# Patient Record
Sex: MALE | Race: WHITE | Employment: UNEMPLOYED | URBAN - METROPOLITAN AREA
[De-identification: names, ages, dates, MRNs, and addresses within clinical notes are randomized per-mention and may not be internally consistent; named-entity substitution may affect disease eponyms.]

---

## 2017-01-31 ENCOUNTER — TELEPHONE (OUTPATIENT)
Dept: CARDIOLOGY | Facility: CLINIC | Age: 57
End: 2017-01-31

## 2017-02-08 ENCOUNTER — OFFICE VISIT (OUTPATIENT)
Dept: CARDIOLOGY | Facility: CLINIC | Age: 57
End: 2017-02-08
Payer: COMMERCIAL

## 2017-02-08 VITALS
WEIGHT: 253.1 LBS | BODY MASS INDEX: 38.36 KG/M2 | SYSTOLIC BLOOD PRESSURE: 128 MMHG | DIASTOLIC BLOOD PRESSURE: 82 MMHG | HEIGHT: 68 IN | HEART RATE: 72 BPM

## 2017-02-08 DIAGNOSIS — I25.10 CORONARY ARTERY DISEASE INVOLVING NATIVE CORONARY ARTERY OF NATIVE HEART WITHOUT ANGINA PECTORIS: Primary | ICD-10-CM

## 2017-02-08 DIAGNOSIS — I24.9 ACS (ACUTE CORONARY SYNDROME) (H): ICD-10-CM

## 2017-02-08 DIAGNOSIS — I10 ESSENTIAL HYPERTENSION: ICD-10-CM

## 2017-02-08 PROCEDURE — 99214 OFFICE O/P EST MOD 30 MIN: CPT | Performed by: INTERNAL MEDICINE

## 2017-02-08 RX ORDER — CLOPIDOGREL BISULFATE 75 MG/1
75 TABLET ORAL DAILY
Qty: 90 TABLET | Refills: 3 | Status: SHIPPED | OUTPATIENT
Start: 2017-02-08

## 2017-02-08 RX ORDER — ATORVASTATIN CALCIUM 20 MG/1
20 TABLET, FILM COATED ORAL DAILY
Qty: 90 TABLET | Refills: 3 | Status: SHIPPED | OUTPATIENT
Start: 2017-02-08

## 2017-02-08 RX ORDER — LOSARTAN POTASSIUM 50 MG/1
50 TABLET ORAL 2 TIMES DAILY
Qty: 180 TABLET | Refills: 3 | Status: SHIPPED | OUTPATIENT
Start: 2017-02-08 | End: 2017-09-08

## 2017-02-08 RX ORDER — NITROGLYCERIN 0.4 MG/1
0.4 TABLET SUBLINGUAL EVERY 5 MIN PRN
Qty: 25 TABLET | Refills: 3 | Status: SHIPPED
Start: 2017-02-08

## 2017-02-08 RX ORDER — METOPROLOL TARTRATE 25 MG/1
25 TABLET, FILM COATED ORAL 2 TIMES DAILY
Qty: 180 TABLET | Refills: 3 | Status: SHIPPED | OUTPATIENT
Start: 2017-02-08

## 2017-02-08 RX ORDER — CHLORAL HYDRATE 500 MG
1 CAPSULE ORAL DAILY
COMMUNITY

## 2017-02-08 NOTE — LETTER
2/8/2017    Giorgio Elizabeth  [address] [address]    RE: Zeyad Mccabe       Dear Colleague,    I had the pleasure of seeing Zeyad Mccabe in the [facility].    Mr. Mccabe is a pleasant 56-year-old male with a history of multivessel coronary disease.  He had 5 stents placed at [facility] in 2009 and he had subsequent revascularization of the left anterior descending artery and right coronary artery in 10/2015.  He is here for a followup visit today.  In the past, he had been intolerant of statin therapies but was placed on low dose Lipitor last year at 10 mg and he seems to be tolerating that quite well without any side effect.  He has not experienced any recurrent chest pain symptoms or dyspnea on exertion or exercise intolerance.  I do not have any recent cholesterol profile as the last one was from 10/2015 prior to his initiation of Lipitor.      PHYSICAL EXAMINATION:  On exam today, his blood pressure is 128/82, pulse is 72, weight 253 which is up about 6 pounds from last year.  Body mass index of 39.  Carotid upstrokes are brisk without bruit.  Cardiovascular tones are regular.  I do not appreciate a murmur, gallop or rub.  The lungs are clear posteriorly.  He has strong and symmetric pulses in the upper and lower extremities without peripheral edema.     Outpatient Encounter Prescriptions as of 2/8/2017   Medication Sig Dispense Refill     fish oil-omega-3 fatty acids 1000 MG capsule Take 1 g by mouth daily       metoprolol (LOPRESSOR) 25 MG tablet Take 1 tablet (25 mg) by mouth 2 times daily 180 tablet 3     atorvastatin (LIPITOR) 20 MG tablet Take 1 tablet (20 mg) by mouth daily 90 tablet 3     losartan (COZAAR) 50 MG tablet Take 1 tablet (50 mg) by mouth 2 times daily 180 tablet 3     clopidogrel (PLAVIX) 75 MG tablet Take 1 tablet (75 mg) by mouth daily 90 tablet 3     nitroglycerin (NITROSTAT) 0.4 MG sublingual tablet Place 1  tablet (0.4 mg) under the tongue every 5 minutes as needed for chest pain If you are still having symptoms after 3 doses (15 minutes) call 911. 25 tablet 3     aspirin EC 81 MG EC tablet Take 1 tablet (81 mg) by mouth daily 30 tablet 0     [DISCONTINUED] losartan (COZAAR) 50 MG tablet Take 1 tablet (50 mg) by mouth 2 times daily 180 tablet 0     [DISCONTINUED] clopidogrel (PLAVIX) 75 MG tablet Take 1 tablet (75 mg) by mouth daily 90 tablet 0     [DISCONTINUED] atorvastatin (LIPITOR) 10 MG tablet Take 1 tablet (10 mg) by mouth daily 30 tablet      [DISCONTINUED] nitroglycerin (NITROSTAT) 0.4 MG SL tablet Place 1 tablet (0.4 mg) under the tongue every 5 minutes as needed for chest pain If you are still having symptoms after 3 doses (15 minutes) call 911. 25 tablet 0     [DISCONTINUED] metoprolol (LOPRESSOR) 25 MG tablet Take 25 mg by mouth 2 times daily       No facility-administered encounter medications on file as of 2/8/2017.       IN SUMMARY:  Mr. Mccabe is a pleasant 56-year-old male with a history of multivessel coronary disease and multivessel stenting.  He has had recent revascularization for in-stent restenosis in his LAD and revascularization of his right coronary artery as well.  He was able to tolerate low dose statin but guidelines would suggest improvement in his overall risk for future cardiovascular events on moderate high-intensity statin therapy and I did talk to him about this today.  Given that he has had severe side effect with myalgias in the past on previous medication I would prefer to continue to slowly increase this medication and I have suggested that we increase from 10 to 20 mg of Lipitor and also recheck his cholesterol numbers in about 3 months.  He is willing to do this, therefore I refilled his prescription at the higher dose of 20 mg to reflect this change.  I also refilled his other medications given multivessel stenting with drug-eluting stents, each one was 38 mm in length and 2 in  the LAD and 1 in the right coronary artery, I would prefer that he stay on dual antiplatelet therapy for a period of 2 years, so I would continue to refill this medication as well as his other cardiac medications as well.  I do note that he was diagnosed with moderate obstructive sleep apnea back in 2015.  He is intolerant to the CPAP mask and has gained further weight since then.  We talked about the consequences of untreated sleep apnea and the importance of treatment.  Certainly, weight loss would be the optimal treatment for his sleep apnea and we talked about this today as well.  He has found obstacles in losing weight.  We discussed dietary changes as well as even 10-15 minutes of exercise daily would be helpful not only to reduce cardiovascular events but for weight loss as well.  I will see him back in 3 months with a lipid profile and liver function tests.        Please feel free to contact me with any questions you have in regard to his care.     Again, thank you for allowing me to participate in the care of your patient.      Sincerely,    Brooklyn Sargent, DO     [facility]

## 2017-02-08 NOTE — PROGRESS NOTES
HPI and Plan:   See dictation    Orders Placed This Encounter   Procedures     Lipid Profile     ALT     Follow-Up with Cardiologist       Orders Placed This Encounter   Medications     fish oil-omega-3 fatty acids 1000 MG capsule     Sig: Take 1 g by mouth daily     metoprolol (LOPRESSOR) 25 MG tablet     Sig: Take 1 tablet (25 mg) by mouth 2 times daily     Dispense:  180 tablet     Refill:  3     atorvastatin (LIPITOR) 20 MG tablet     Sig: Take 1 tablet (20 mg) by mouth daily     Dispense:  90 tablet     Refill:  3     losartan (COZAAR) 50 MG tablet     Sig: Take 1 tablet (50 mg) by mouth 2 times daily     Dispense:  180 tablet     Refill:  3     clopidogrel (PLAVIX) 75 MG tablet     Sig: Take 1 tablet (75 mg) by mouth daily     Dispense:  90 tablet     Refill:  3     nitroglycerin (NITROSTAT) 0.4 MG sublingual tablet     Sig: Place 1 tablet (0.4 mg) under the tongue every 5 minutes as needed for chest pain If you are still having symptoms after 3 doses (15 minutes) call 911.     Dispense:  25 tablet     Refill:  3       Medications Discontinued During This Encounter   Medication Reason     metoprolol (LOPRESSOR) 25 MG tablet Reorder     atorvastatin (LIPITOR) 10 MG tablet Reorder     losartan (COZAAR) 50 MG tablet Reorder     clopidogrel (PLAVIX) 75 MG tablet Reorder     nitroglycerin (NITROSTAT) 0.4 MG SL tablet Reorder         Encounter Diagnoses   Name Primary?     Coronary artery disease involving native coronary artery of native heart without angina pectoris Yes     Essential hypertension      ACS (acute coronary syndrome) (H)        CURRENT MEDICATIONS:  Current Outpatient Prescriptions   Medication Sig Dispense Refill     fish oil-omega-3 fatty acids 1000 MG capsule Take 1 g by mouth daily       metoprolol (LOPRESSOR) 25 MG tablet Take 1 tablet (25 mg) by mouth 2 times daily 180 tablet 3     atorvastatin (LIPITOR) 20 MG tablet Take 1 tablet (20 mg) by mouth daily 90 tablet 3     losartan (COZAAR) 50 MG  tablet Take 1 tablet (50 mg) by mouth 2 times daily 180 tablet 3     clopidogrel (PLAVIX) 75 MG tablet Take 1 tablet (75 mg) by mouth daily 90 tablet 3     nitroglycerin (NITROSTAT) 0.4 MG sublingual tablet Place 1 tablet (0.4 mg) under the tongue every 5 minutes as needed for chest pain If you are still having symptoms after 3 doses (15 minutes) call 911. 25 tablet 3     aspirin EC 81 MG EC tablet Take 1 tablet (81 mg) by mouth daily 30 tablet 0     [DISCONTINUED] losartan (COZAAR) 50 MG tablet Take 1 tablet (50 mg) by mouth 2 times daily 180 tablet 0     [DISCONTINUED] clopidogrel (PLAVIX) 75 MG tablet Take 1 tablet (75 mg) by mouth daily 90 tablet 0     [DISCONTINUED] atorvastatin (LIPITOR) 10 MG tablet Take 1 tablet (10 mg) by mouth daily 30 tablet      [DISCONTINUED] nitroglycerin (NITROSTAT) 0.4 MG SL tablet Place 1 tablet (0.4 mg) under the tongue every 5 minutes as needed for chest pain If you are still having symptoms after 3 doses (15 minutes) call 911. 25 tablet 0     [DISCONTINUED] metoprolol (LOPRESSOR) 25 MG tablet Take 25 mg by mouth 2 times daily         ALLERGIES   No Known Allergies    PAST MEDICAL HISTORY:  Past Medical History   Diagnosis Date     Mixed hyperlipidemia 10/23/2015     Essential hypertension 10/23/2015     NSTEMI (non-ST elevated myocardial infarction) (H) 10/23/2015     History of PTCA 2007     multivessel stenting done thru [facility]     Acute chest pain 10/23/2015     CAD (coronary artery disease) 2007, 2015      stent     SHRUTI (obstructive sleep apnea)        PAST SURGICAL HISTORY:  Past Surgical History   Procedure Laterality Date     Stent, coronary, [place]ison  10-     BIBI=to mid RCA ,BIBI x 2 to mid/distal LAD     Stent, coronary, maddison  2007     [facility]       FAMILY HISTORY:  Family History   Problem Relation Age of Onset     Other - See Comments       Heart disease- both grandfathers.        SOCIAL HISTORY:  Social History     Social History     Marital Status: Single      "Spouse Name: N/A     Number of Children: N/A     Years of Education: N/A     Social History Main Topics     Smoking status: Former Smoker     Smokeless tobacco: None     Alcohol Use: 0.0 oz/week     0 Standard drinks or equivalent per week      Comment: 1-2 beers per month     Drug Use: None     Sexual Activity: Not Asked     Other Topics Concern     Caffeine Concern No     2 cups of coffee and one pop daily     Special Diet No     normal     Exercise No     work is physical     Social History Narrative       Review of Systems:  Skin:  Positive for bruising     Eyes:  Positive for glasses    ENT:  Negative      Respiratory:  Positive for sleep apnea does not wear CPAP   Cardiovascular:  Negative      Gastroenterology: Negative      Genitourinary:  Negative      Musculoskeletal:  Negative      Neurologic:  Negative      Psychiatric:  Negative      Heme/Lymph/Imm:  Negative      Endocrine:  Negative        Physical Exam:  Vitals: /82 mmHg  Pulse 72  Ht 1.715 m (5' 7.5\")  Wt 114.805 kg (253 lb 1.6 oz)  BMI 39.03 kg/m2    Constitutional:  cooperative;alert and oriented obese      Skin:  warm and dry to the touch        Head:  normocephalic        Eyes:  pupils equal and round;sclera white        ENT:  no pallor or cyanosis        Neck:  carotid pulses are full and equal bilaterally        Chest:  clear to auscultation;normal symmetry;normal respiratory excursion          Cardiac: regular rhythm;normal S1 and S2   distant heart sounds              Abdomen:  abdomen soft obese      Vascular: pulses full and equal, no bruits auscultated                                   right radial puncture site without bleed/hematoma/bruit    Extremities and Back:  no edema;no deformities, clubbing, cyanosis, erythema observed              Neurological:  affect appropriate, oriented to time, person and place;no gross motor deficits                Brooklyn Sargent, DO   [facility]  [address]  [place] [address]

## 2017-02-08 NOTE — MR AVS SNAPSHOT
After Visit Summary   2/8/2017    Zeyad Mccabe    MRN: [id number]           Patient Information     Date Of Birth          1960        Visit Information        Provider Department      2/8/2017 3:45 PM Brooklyn Sargent DO [facility]        Today's Diagnoses     Coronary artery disease involving native coronary artery of native heart without angina pectoris    -  1     Essential hypertension         ACS (acute coronary syndrome) (H)            Follow-ups after your visit        Additional Services     Follow-Up with Cardiologist                 Your next 10 appointments already scheduled     May 04, 2017  7:30 AM   LAB with RU LAB   [facility] ([facility])    [address] [place] [address] [address]  [facility] [address] [phone]           Patient must bring picture ID.  Patient should be prepared to give a urine specimen  Please do not eat 10-12 hours before your appointment if you are coming in fasting for labs on lipids, cholesterol, or glucose (sugar).  Pregnant women should follow their Care Team instructions. Water with medications is okay. Do not drink coffee or other fluids.   If you have concerns about taking  your medications, please ask at office or if scheduling via [employer], send a message by clicking on Secure Messaging, Message Your Care Team.            May 04, 2017  4:15 PM   Return Visit with Brooklyn Sargent DO   [facility] ([facility])    [address] [place] [address]  [facility] [id number] [phone]              Future tests that were ordered for you today     Open Future Orders        Priority Expected Expires Ordered    Lipid Profile Routine 5/9/2017 2/8/2018 2/8/2017    ALT Routine 5/9/2017 2/8/2018 2/8/2017    Follow-Up with Cardiologist Routine 5/9/2017 2/8/2018 2/8/2017            Who to contact     If you have  "questions or need follow up information about today's clinic visit or your schedule please contact [facility] [place] directly at [phone].  Normal or non-critical lab and imaging results will be communicated to you by MyChart, letter or phone within 4 business days after the clinic has received the results. If you do not hear from us within 7 days, please contact the clinic through [employer] or phone. If you have a critical or abnormal lab result, we will notify you by phone as soon as possible.  Submit refill requests through [employer] or call your pharmacy and they will forward the refill request to us. Please allow 3 business days for your refill to be completed.          Additional Information About Your Visit        [employer] Stack Information     [employer] lets you send messages to your doctor, view your test results, renew your prescriptions, schedule appointments and more. To sign up, go to [email or web address] . Click on \"Log in\" on the left side of the screen, which will take you to the Welcome page. Then click on \"Sign up Now\" on the right side of the page.     You will be asked to enter the access code listed below, as well as some personal information. Please follow the directions to create your username and password.     Your access code is: [email or web address]  Expires: 2017  4:20 PM     Your access code will  in 90 days. If you need help or a new code, please call your [place] [facility] clinic or [phone].        Care EveryWhere ID     This is your Care EveryWhere ID. This could be used by other organizations to access your [facility] medical records  [id number]        Your Vitals Were     Pulse Height BMI (Body Mass Index)             72 1.715 m (5' 7.5\") 39.03 kg/m2          Blood Pressure from Last 3 Encounters:   17 128/82   11/11/15 130/84   10/15/15 132/74    Weight from Last 3 Encounters:   17 114.805 kg (253 lb 1.6 oz)   11/11/15 112.038 kg (247 lb) "   10/15/15 112.2 kg (247 lb 5.7 oz)                 Today's Medication Changes          These changes are accurate as of: 2/8/17  4:20 PM.  If you have any questions, ask your nurse or doctor.               These medicines have changed or have updated prescriptions.        Dose/Directions    atorvastatin 20 MG tablet   Commonly known as:  LIPITOR   This may have changed:    - medication strength  - how much to take   Used for:  Coronary artery disease involving native coronary artery of native heart without angina pectoris   Changed by:  Brooklyn Sargent,         Dose:  20 mg   Take 1 tablet (20 mg) by mouth daily   Quantity:  90 tablet   Refills:  3            Where to get your medicines      These medications were sent to [place] [address] [place] [facility] - [place] - [address]  [facility]  [address]  [facility], [facility] [address]     Phone:  [phone]    - atorvastatin 20 MG tablet  - clopidogrel 75 MG tablet  - losartan 50 MG tablet  - metoprolol 25 MG tablet  - nitroglycerin 0.4 MG sublingual tablet             Primary Care Provider Office Phone # Fax #    Giorgio Clearyshanell [phone]       [address] [address]        Thank you!     Thank you for choosing [facility] [place]  for your care. Our goal is always to provide you with excellent care. Hearing back from our patients is one way we can continue to improve our services. Please take a few minutes to complete the written survey that you may receive in the mail after your visit with us. Thank you!             Your Updated Medication List - Protect others around you: Learn how to safely use, store and throw away your medicines at www.disposemymeds.org.          This list is accurate as of: 2/8/17  4:20 PM.  Always use your most recent med list.                   Brand Name Dispense Instructions for use    aspirin 81 MG EC tablet     30 tablet    Take 1 tablet  (81 mg) by mouth daily       atorvastatin 20 MG tablet    LIPITOR    90 tablet    Take 1 tablet (20 mg) by mouth daily       clopidogrel 75 MG tablet    PLAVIX    90 tablet    Take 1 tablet (75 mg) by mouth daily       fish oil-omega-3 fatty acids 1000 MG capsule      Take 1 g by mouth daily       losartan 50 MG tablet    COZAAR    180 tablet    Take 1 tablet (50 mg) by mouth 2 times daily       metoprolol 25 MG tablet    LOPRESSOR    180 tablet    Take 1 tablet (25 mg) by mouth 2 times daily       nitroglycerin 0.4 MG sublingual tablet    NITROSTAT    25 tablet    Place 1 tablet (0.4 mg) under the tongue every 5 minutes as needed for chest pain If you are still having symptoms after 3 doses (15 minutes) call 911.

## 2017-02-09 NOTE — PROGRESS NOTES
HISTORY OF PRESENT ILLNESS:  Mr. Mccabe is a pleasant 56-year-old male with a history of multivessel coronary disease.  He had 5 stents placed at [facility] in 2009 and he had subsequent revascularization of the left anterior descending artery and right coronary artery in 10/2015.  He is here for a followup visit today.  In the past, he had been intolerant of statin therapies but was placed on low dose Lipitor last year at 10 mg and he seems to be tolerating that quite well without any side effect.  He has not experienced any recurrent chest pain symptoms or dyspnea on exertion or exercise intolerance.  I do not have any recent cholesterol profile as the last one was from 10/2015 prior to his initiation of Lipitor.      PHYSICAL EXAMINATION:  On exam today, his blood pressure is 128/82, pulse is 72, weight 253 which is up about 6 pounds from last year.  Body mass index of 39.  Carotid upstrokes are brisk without bruit.  Cardiovascular tones are regular.  I do not appreciate a murmur, gallop or rub.  The lungs are clear posteriorly.  He has strong and symmetric pulses in the upper and lower extremities without peripheral edema.      IN SUMMARY:  Mr. Mccabe is a pleasant 56-year-old male with a history of multivessel coronary disease and multivessel stenting.  He has had recent revascularization for in-stent restenosis in his LAD and revascularization of his right coronary artery as well.  He was able to tolerate low dose statin but guidelines would suggest improvement in his overall risk for future cardiovascular events on moderate high-intensity statin therapy and I did talk to him about this today.  Given that he has had severe side effect with myalgias in the past on previous medication I would prefer to continue to slowly increase this medication and I have suggested that we increase from 10 to 20 mg of Lipitor and also recheck his cholesterol numbers in about 3 months.  He is willing to do this, therefore I  refilled his prescription at the higher dose of 20 mg to reflect this change.  I also refilled his other medications given multivessel stenting with drug-eluting stents, each one was 38 mm in length and 2 in the LAD and 1 in the right coronary artery, I would prefer that he stay on dual antiplatelet therapy for a period of 2 years, so I would continue to refill this medication as well as his other cardiac medications as well.  I do note that he was diagnosed with moderate obstructive sleep apnea back in .  He is intolerant to the CPAP mask and has gained further weight since then.  We talked about the consequences of untreated sleep apnea and the importance of treatment.  Certainly, weight loss would be the optimal treatment for his sleep apnea and we talked about this today as well.  He has found obstacles in losing weight.  We discussed dietary changes as well as even 10-15 minutes of exercise daily would be helpful not only to reduce cardiovascular events but for weight loss as well.  I will see him back in 3 months with a lipid profile and liver function tests.        Please feel free to contact me with any questions you have in regard to his care.      cc:   [place] [address]         LATA RICHARDSON DO             D: 2017 16:17   T: 2017 07:37   MT: YANA      Name:     JT NAVAS   MRN:      [id number]        Account:      [id number]   :      1960           Service Date: 2017      Document: [id number]

## 2017-09-08 DIAGNOSIS — I10 ESSENTIAL HYPERTENSION: ICD-10-CM

## 2017-09-08 DIAGNOSIS — I25.10 CORONARY ARTERY DISEASE INVOLVING NATIVE CORONARY ARTERY OF NATIVE HEART WITHOUT ANGINA PECTORIS: ICD-10-CM

## 2017-09-08 RX ORDER — LOSARTAN POTASSIUM 50 MG/1
50 TABLET ORAL 2 TIMES DAILY
Qty: 180 TABLET | Refills: 1 | Status: SHIPPED | OUTPATIENT
Start: 2017-09-08

## 2019-11-26 ENCOUNTER — TRANSFERRED RECORDS (OUTPATIENT)
Dept: HEALTH INFORMATION MANAGEMENT | Facility: CLINIC | Age: 59
End: 2019-11-26

## 2019-11-27 LAB
ALT SERPL-CCNC: 38 IU/L (ref 8–45)
AST SERPL-CCNC: 30 IU/L (ref 2–40)
CHOLEST SERPL-MCNC: 283 MG/DL (ref 100–199)
EJECTION FRACTION: 61 %
GLUCOSE SERPL-MCNC: 104 MG/DL (ref 65–100)
HBA1C MFR BLD: 6.4 % (ref 0–?)
HDLC SERPL-MCNC: 38 MG/DL
LDLC SERPL CALC-MCNC: 203 MG/DL
NONHDLC SERPL-MCNC: 245 MG/DL
TRIGL SERPL-MCNC: 211 MG/DL

## 2019-11-28 LAB
CREAT SERPL-MCNC: 1.16 MG/DL (ref 0.72–1.25)
GFR SERPL CREATININE-BSD FRML MDRD: >60 ML/MIN/1.73M2
POTASSIUM SERPL-SCNC: 4 MMOL/L (ref 3.5–5)

## 2019-12-13 ENCOUNTER — HOSPITAL ENCOUNTER (OUTPATIENT)
Dept: CARDIAC REHAB | Facility: CLINIC | Age: 59
End: 2019-12-13
Attending: INTERNAL MEDICINE
Payer: MEDICAID

## 2019-12-13 PROCEDURE — 40000116 ZZH STATISTIC OP CR VISIT

## 2019-12-13 PROCEDURE — 93798 PHYS/QHP OP CAR RHAB W/ECG: CPT

## 2019-12-16 ENCOUNTER — HOSPITAL ENCOUNTER (OUTPATIENT)
Dept: CARDIAC REHAB | Facility: CLINIC | Age: 59
End: 2019-12-16
Attending: INTERNAL MEDICINE
Payer: MEDICAID

## 2019-12-16 PROCEDURE — 93798 PHYS/QHP OP CAR RHAB W/ECG: CPT

## 2019-12-16 PROCEDURE — 40000116 ZZH STATISTIC OP CR VISIT

## 2019-12-18 ENCOUNTER — HOSPITAL ENCOUNTER (OUTPATIENT)
Dept: CARDIAC REHAB | Facility: CLINIC | Age: 59
End: 2019-12-18
Attending: INTERNAL MEDICINE
Payer: MEDICAID

## 2019-12-18 PROCEDURE — 40000116 ZZH STATISTIC OP CR VISIT

## 2019-12-18 PROCEDURE — 93798 PHYS/QHP OP CAR RHAB W/ECG: CPT

## 2019-12-20 ENCOUNTER — HOSPITAL ENCOUNTER (OUTPATIENT)
Dept: CARDIAC REHAB | Facility: CLINIC | Age: 59
End: 2019-12-20
Attending: INTERNAL MEDICINE
Payer: MEDICAID

## 2019-12-20 PROCEDURE — 93798 PHYS/QHP OP CAR RHAB W/ECG: CPT

## 2019-12-20 PROCEDURE — 40000116 ZZH STATISTIC OP CR VISIT

## 2019-12-24 ENCOUNTER — HOSPITAL ENCOUNTER (OUTPATIENT)
Dept: CARDIAC REHAB | Facility: CLINIC | Age: 59
End: 2019-12-24
Attending: INTERNAL MEDICINE
Payer: MEDICAID

## 2019-12-24 PROCEDURE — 93798 PHYS/QHP OP CAR RHAB W/ECG: CPT

## 2019-12-24 PROCEDURE — 40000116 ZZH STATISTIC OP CR VISIT

## 2019-12-26 ENCOUNTER — HOSPITAL ENCOUNTER (OUTPATIENT)
Dept: CARDIAC REHAB | Facility: CLINIC | Age: 59
End: 2019-12-26
Attending: INTERNAL MEDICINE
Payer: MEDICAID

## 2019-12-26 PROCEDURE — 40000116 ZZH STATISTIC OP CR VISIT

## 2019-12-26 PROCEDURE — 93798 PHYS/QHP OP CAR RHAB W/ECG: CPT

## 2020-01-02 ENCOUNTER — HOSPITAL ENCOUNTER (OUTPATIENT)
Dept: CARDIAC REHAB | Facility: CLINIC | Age: 60
End: 2020-01-02
Attending: INTERNAL MEDICINE
Payer: MEDICAID

## 2020-01-02 PROCEDURE — 40000116 ZZH STATISTIC OP CR VISIT

## 2020-01-02 PROCEDURE — 93798 PHYS/QHP OP CAR RHAB W/ECG: CPT

## 2020-01-03 ENCOUNTER — HOSPITAL ENCOUNTER (OUTPATIENT)
Dept: CARDIAC REHAB | Facility: CLINIC | Age: 60
End: 2020-01-03
Attending: INTERNAL MEDICINE
Payer: MEDICAID

## 2020-01-03 PROCEDURE — 40000116 ZZH STATISTIC OP CR VISIT

## 2020-01-03 PROCEDURE — 93798 PHYS/QHP OP CAR RHAB W/ECG: CPT

## 2020-01-07 ENCOUNTER — HOSPITAL ENCOUNTER (OUTPATIENT)
Dept: CARDIAC REHAB | Facility: CLINIC | Age: 60
End: 2020-01-07
Attending: INTERNAL MEDICINE
Payer: MEDICAID

## 2020-01-07 PROCEDURE — 93798 PHYS/QHP OP CAR RHAB W/ECG: CPT

## 2020-01-07 PROCEDURE — 40000116 ZZH STATISTIC OP CR VISIT

## 2020-01-10 ENCOUNTER — HOSPITAL ENCOUNTER (OUTPATIENT)
Dept: CARDIAC REHAB | Facility: CLINIC | Age: 60
End: 2020-01-10
Attending: INTERNAL MEDICINE
Payer: MEDICAID

## 2020-01-10 PROCEDURE — 93798 PHYS/QHP OP CAR RHAB W/ECG: CPT

## 2020-01-10 PROCEDURE — 40000116 ZZH STATISTIC OP CR VISIT

## 2020-01-13 ENCOUNTER — HOSPITAL ENCOUNTER (OUTPATIENT)
Dept: CARDIAC REHAB | Facility: CLINIC | Age: 60
End: 2020-01-13
Attending: INTERNAL MEDICINE
Payer: MEDICAID

## 2020-01-13 PROCEDURE — 40000116 ZZH STATISTIC OP CR VISIT: Performed by: OCCUPATIONAL THERAPIST

## 2020-01-13 PROCEDURE — 93798 PHYS/QHP OP CAR RHAB W/ECG: CPT | Performed by: OCCUPATIONAL THERAPIST

## 2020-01-15 ENCOUNTER — HOSPITAL ENCOUNTER (OUTPATIENT)
Dept: CARDIAC REHAB | Facility: CLINIC | Age: 60
End: 2020-01-15
Attending: INTERNAL MEDICINE
Payer: MEDICAID

## 2020-01-15 PROCEDURE — 93798 PHYS/QHP OP CAR RHAB W/ECG: CPT

## 2020-01-15 PROCEDURE — 40000116 ZZH STATISTIC OP CR VISIT

## 2020-01-17 ENCOUNTER — HOSPITAL ENCOUNTER (OUTPATIENT)
Dept: CARDIAC REHAB | Facility: CLINIC | Age: 60
End: 2020-01-17
Attending: INTERNAL MEDICINE
Payer: MEDICAID

## 2020-01-17 PROCEDURE — 40000116 ZZH STATISTIC OP CR VISIT

## 2020-01-17 PROCEDURE — 93797 PHYS/QHP OP CAR RHAB WO ECG: CPT | Mod: 59

## 2020-01-17 PROCEDURE — 93798 PHYS/QHP OP CAR RHAB W/ECG: CPT

## 2020-01-17 PROCEDURE — 40000575 ZZH STATISTIC OP CARDIAC VISIT #2

## 2020-01-20 ENCOUNTER — HOSPITAL ENCOUNTER (OUTPATIENT)
Dept: CARDIAC REHAB | Facility: CLINIC | Age: 60
End: 2020-01-20
Attending: INTERNAL MEDICINE
Payer: MEDICAID

## 2020-01-20 PROCEDURE — 40000116 ZZH STATISTIC OP CR VISIT

## 2020-01-20 PROCEDURE — 93798 PHYS/QHP OP CAR RHAB W/ECG: CPT

## 2020-01-22 ENCOUNTER — HOSPITAL ENCOUNTER (OUTPATIENT)
Dept: CARDIAC REHAB | Facility: CLINIC | Age: 60
End: 2020-01-22
Attending: INTERNAL MEDICINE
Payer: MEDICAID

## 2020-01-22 PROCEDURE — 93798 PHYS/QHP OP CAR RHAB W/ECG: CPT

## 2020-01-22 PROCEDURE — 40000116 ZZH STATISTIC OP CR VISIT

## 2020-01-24 ENCOUNTER — HOSPITAL ENCOUNTER (OUTPATIENT)
Dept: CARDIAC REHAB | Facility: CLINIC | Age: 60
End: 2020-01-24
Attending: INTERNAL MEDICINE
Payer: MEDICAID

## 2020-01-24 PROCEDURE — 93798 PHYS/QHP OP CAR RHAB W/ECG: CPT

## 2020-01-24 PROCEDURE — 40000116 ZZH STATISTIC OP CR VISIT

## 2020-01-29 ENCOUNTER — HOSPITAL ENCOUNTER (OUTPATIENT)
Dept: CARDIAC REHAB | Facility: CLINIC | Age: 60
End: 2020-01-29
Attending: INTERNAL MEDICINE
Payer: MEDICAID

## 2020-01-29 PROCEDURE — 93798 PHYS/QHP OP CAR RHAB W/ECG: CPT

## 2020-01-29 PROCEDURE — 40000116 ZZH STATISTIC OP CR VISIT

## 2020-01-31 ENCOUNTER — HOSPITAL ENCOUNTER (OUTPATIENT)
Dept: CARDIAC REHAB | Facility: CLINIC | Age: 60
End: 2020-01-31
Attending: INTERNAL MEDICINE
Payer: MEDICAID

## 2020-01-31 PROCEDURE — 93798 PHYS/QHP OP CAR RHAB W/ECG: CPT

## 2020-01-31 PROCEDURE — 40000116 ZZH STATISTIC OP CR VISIT

## 2020-02-03 ENCOUNTER — HOSPITAL ENCOUNTER (OUTPATIENT)
Dept: CARDIAC REHAB | Facility: CLINIC | Age: 60
End: 2020-02-03
Attending: INTERNAL MEDICINE
Payer: COMMERCIAL

## 2020-02-03 PROCEDURE — 40000116 ZZH STATISTIC OP CR VISIT

## 2020-02-03 PROCEDURE — 93798 PHYS/QHP OP CAR RHAB W/ECG: CPT

## 2020-02-05 ENCOUNTER — HOSPITAL ENCOUNTER (OUTPATIENT)
Dept: CARDIAC REHAB | Facility: CLINIC | Age: 60
End: 2020-02-05
Attending: INTERNAL MEDICINE
Payer: COMMERCIAL

## 2020-02-05 PROCEDURE — 40000116 ZZH STATISTIC OP CR VISIT

## 2020-02-05 PROCEDURE — 93798 PHYS/QHP OP CAR RHAB W/ECG: CPT

## 2020-02-10 ENCOUNTER — HOSPITAL ENCOUNTER (OUTPATIENT)
Dept: CARDIAC REHAB | Facility: CLINIC | Age: 60
End: 2020-02-10
Attending: INTERNAL MEDICINE
Payer: COMMERCIAL

## 2020-02-10 PROCEDURE — 93798 PHYS/QHP OP CAR RHAB W/ECG: CPT

## 2020-02-10 PROCEDURE — 40000116 ZZH STATISTIC OP CR VISIT

## 2020-02-12 ENCOUNTER — HOSPITAL ENCOUNTER (OUTPATIENT)
Dept: CARDIAC REHAB | Facility: CLINIC | Age: 60
End: 2020-02-12
Attending: INTERNAL MEDICINE
Payer: COMMERCIAL

## 2020-02-12 PROCEDURE — 40000116 ZZH STATISTIC OP CR VISIT

## 2020-02-12 PROCEDURE — 93798 PHYS/QHP OP CAR RHAB W/ECG: CPT

## 2020-02-13 ENCOUNTER — HOSPITAL ENCOUNTER (OUTPATIENT)
Dept: CARDIAC REHAB | Facility: CLINIC | Age: 60
End: 2020-02-13
Attending: INTERNAL MEDICINE
Payer: COMMERCIAL

## 2020-02-13 PROCEDURE — 40000575 ZZH STATISTIC OP CARDIAC VISIT #2

## 2020-02-13 PROCEDURE — 40000116 ZZH STATISTIC OP CR VISIT

## 2020-02-13 PROCEDURE — 93798 PHYS/QHP OP CAR RHAB W/ECG: CPT

## 2020-02-13 PROCEDURE — 93797 PHYS/QHP OP CAR RHAB WO ECG: CPT | Mod: 59
